# Patient Record
Sex: MALE | Race: BLACK OR AFRICAN AMERICAN | NOT HISPANIC OR LATINO | Employment: FULL TIME | URBAN - METROPOLITAN AREA
[De-identification: names, ages, dates, MRNs, and addresses within clinical notes are randomized per-mention and may not be internally consistent; named-entity substitution may affect disease eponyms.]

---

## 2023-08-09 ENCOUNTER — APPOINTMENT (OUTPATIENT)
Dept: PRIMARY CARE | Facility: CLINIC | Age: 28
End: 2023-08-09
Payer: COMMERCIAL

## 2024-02-10 ENCOUNTER — HOSPITAL ENCOUNTER (EMERGENCY)
Facility: HOSPITAL | Age: 29
Discharge: HOME | End: 2024-02-11
Attending: EMERGENCY MEDICINE
Payer: COMMERCIAL

## 2024-02-10 ENCOUNTER — APPOINTMENT (OUTPATIENT)
Dept: CARDIOLOGY | Facility: HOSPITAL | Age: 29
End: 2024-02-10
Payer: COMMERCIAL

## 2024-02-10 ENCOUNTER — APPOINTMENT (OUTPATIENT)
Dept: RADIOLOGY | Facility: HOSPITAL | Age: 29
End: 2024-02-10
Payer: COMMERCIAL

## 2024-02-10 DIAGNOSIS — R11.2 NAUSEA AND VOMITING, UNSPECIFIED VOMITING TYPE: Primary | ICD-10-CM

## 2024-02-10 LAB
ALBUMIN SERPL BCP-MCNC: 4.9 G/DL (ref 3.4–5)
ALP SERPL-CCNC: 62 U/L (ref 33–120)
ALT SERPL W P-5'-P-CCNC: 31 U/L (ref 10–52)
ANION GAP SERPL CALC-SCNC: 13 MMOL/L (ref 10–20)
AST SERPL W P-5'-P-CCNC: 26 U/L (ref 9–39)
BASOPHILS # BLD AUTO: 0.04 X10*3/UL (ref 0–0.1)
BASOPHILS NFR BLD AUTO: 0.4 %
BILIRUB SERPL-MCNC: 1.3 MG/DL (ref 0–1.2)
BUN SERPL-MCNC: 21 MG/DL (ref 6–23)
CALCIUM SERPL-MCNC: 9.8 MG/DL (ref 8.6–10.3)
CHLORIDE SERPL-SCNC: 97 MMOL/L (ref 98–107)
CO2 SERPL-SCNC: 29 MMOL/L (ref 21–32)
CREAT SERPL-MCNC: 1.51 MG/DL (ref 0.5–1.3)
EGFRCR SERPLBLD CKD-EPI 2021: 65 ML/MIN/1.73M*2
EOSINOPHIL # BLD AUTO: 0.05 X10*3/UL (ref 0–0.7)
EOSINOPHIL NFR BLD AUTO: 0.5 %
ERYTHROCYTE [DISTWIDTH] IN BLOOD BY AUTOMATED COUNT: 12 % (ref 11.5–14.5)
FLUAV RNA RESP QL NAA+PROBE: NOT DETECTED
FLUBV RNA RESP QL NAA+PROBE: NOT DETECTED
GLUCOSE SERPL-MCNC: 92 MG/DL (ref 74–99)
HCT VFR BLD AUTO: 56 % (ref 41–52)
HGB BLD-MCNC: 19.7 G/DL (ref 13.5–17.5)
IMM GRANULOCYTES # BLD AUTO: 0.06 X10*3/UL (ref 0–0.7)
IMM GRANULOCYTES NFR BLD AUTO: 0.6 % (ref 0–0.9)
LIPASE SERPL-CCNC: 19 U/L (ref 9–82)
LYMPHOCYTES # BLD AUTO: 0.47 X10*3/UL (ref 1.2–4.8)
LYMPHOCYTES NFR BLD AUTO: 4.4 %
MAGNESIUM SERPL-MCNC: 1.94 MG/DL (ref 1.6–2.4)
MCH RBC QN AUTO: 32 PG (ref 26–34)
MCHC RBC AUTO-ENTMCNC: 35.2 G/DL (ref 32–36)
MCV RBC AUTO: 91 FL (ref 80–100)
MONOCYTES # BLD AUTO: 0.7 X10*3/UL (ref 0.1–1)
MONOCYTES NFR BLD AUTO: 6.5 %
NEUTROPHILS # BLD AUTO: 9.42 X10*3/UL (ref 1.2–7.7)
NEUTROPHILS NFR BLD AUTO: 87.6 %
NRBC BLD-RTO: 0 /100 WBCS (ref 0–0)
PLATELET # BLD AUTO: 199 X10*3/UL (ref 150–450)
POTASSIUM SERPL-SCNC: 4.2 MMOL/L (ref 3.5–5.3)
PROT SERPL-MCNC: 8.1 G/DL (ref 6.4–8.2)
RBC # BLD AUTO: 6.16 X10*6/UL (ref 4.5–5.9)
SARS-COV-2 RNA RESP QL NAA+PROBE: NOT DETECTED
SODIUM SERPL-SCNC: 135 MMOL/L (ref 136–145)
WBC # BLD AUTO: 10.7 X10*3/UL (ref 4.4–11.3)

## 2024-02-10 PROCEDURE — 71046 X-RAY EXAM CHEST 2 VIEWS: CPT | Performed by: SURGERY

## 2024-02-10 PROCEDURE — 83735 ASSAY OF MAGNESIUM: CPT | Performed by: EMERGENCY MEDICINE

## 2024-02-10 PROCEDURE — 96375 TX/PRO/DX INJ NEW DRUG ADDON: CPT

## 2024-02-10 PROCEDURE — 96361 HYDRATE IV INFUSION ADD-ON: CPT

## 2024-02-10 PROCEDURE — 36415 COLL VENOUS BLD VENIPUNCTURE: CPT | Performed by: EMERGENCY MEDICINE

## 2024-02-10 PROCEDURE — 96374 THER/PROPH/DIAG INJ IV PUSH: CPT

## 2024-02-10 PROCEDURE — 80053 COMPREHEN METABOLIC PANEL: CPT | Performed by: EMERGENCY MEDICINE

## 2024-02-10 PROCEDURE — 99284 EMERGENCY DEPT VISIT MOD MDM: CPT | Mod: 25 | Performed by: EMERGENCY MEDICINE

## 2024-02-10 PROCEDURE — 2500000004 HC RX 250 GENERAL PHARMACY W/ HCPCS (ALT 636 FOR OP/ED): Performed by: EMERGENCY MEDICINE

## 2024-02-10 PROCEDURE — 87636 SARSCOV2 & INF A&B AMP PRB: CPT | Performed by: EMERGENCY MEDICINE

## 2024-02-10 PROCEDURE — 85025 COMPLETE CBC W/AUTO DIFF WBC: CPT | Performed by: EMERGENCY MEDICINE

## 2024-02-10 PROCEDURE — 93005 ELECTROCARDIOGRAM TRACING: CPT

## 2024-02-10 PROCEDURE — 83690 ASSAY OF LIPASE: CPT | Performed by: EMERGENCY MEDICINE

## 2024-02-10 PROCEDURE — 71046 X-RAY EXAM CHEST 2 VIEWS: CPT

## 2024-02-10 RX ORDER — ONDANSETRON HYDROCHLORIDE 2 MG/ML
4 INJECTION, SOLUTION INTRAVENOUS ONCE
Status: COMPLETED | OUTPATIENT
Start: 2024-02-10 | End: 2024-02-10

## 2024-02-10 RX ORDER — KETOROLAC TROMETHAMINE 30 MG/ML
15 INJECTION, SOLUTION INTRAMUSCULAR; INTRAVENOUS ONCE
Status: DISCONTINUED | OUTPATIENT
Start: 2024-02-10 | End: 2024-02-11 | Stop reason: HOSPADM

## 2024-02-10 RX ORDER — ONDANSETRON 4 MG/1
8 TABLET, ORALLY DISINTEGRATING ORAL EVERY 8 HOURS PRN
Qty: 30 TABLET | Refills: 0 | Status: SHIPPED | OUTPATIENT
Start: 2024-02-10

## 2024-02-10 RX ORDER — METOCLOPRAMIDE 10 MG/1
10 TABLET ORAL EVERY 6 HOURS PRN
Qty: 28 TABLET | Refills: 0 | Status: SHIPPED | OUTPATIENT
Start: 2024-02-10 | End: 2024-02-17

## 2024-02-10 RX ORDER — METOCLOPRAMIDE HYDROCHLORIDE 5 MG/ML
10 INJECTION INTRAMUSCULAR; INTRAVENOUS ONCE
Status: COMPLETED | OUTPATIENT
Start: 2024-02-10 | End: 2024-02-10

## 2024-02-10 RX ADMIN — SODIUM CHLORIDE, POTASSIUM CHLORIDE, SODIUM LACTATE AND CALCIUM CHLORIDE 1000 ML: 600; 310; 30; 20 INJECTION, SOLUTION INTRAVENOUS at 23:14

## 2024-02-10 RX ADMIN — METOCLOPRAMIDE 10 MG: 5 INJECTION, SOLUTION INTRAMUSCULAR; INTRAVENOUS at 22:51

## 2024-02-10 RX ADMIN — ONDANSETRON 4 MG: 2 INJECTION INTRAMUSCULAR; INTRAVENOUS at 22:51

## 2024-02-10 ASSESSMENT — LIFESTYLE VARIABLES
HAVE PEOPLE ANNOYED YOU BY CRITICIZING YOUR DRINKING: NO
EVER HAD A DRINK FIRST THING IN THE MORNING TO STEADY YOUR NERVES TO GET RID OF A HANGOVER: NO
HAVE YOU EVER FELT YOU SHOULD CUT DOWN ON YOUR DRINKING: NO
EVER FELT BAD OR GUILTY ABOUT YOUR DRINKING: NO

## 2024-02-10 ASSESSMENT — COLUMBIA-SUICIDE SEVERITY RATING SCALE - C-SSRS
2. HAVE YOU ACTUALLY HAD ANY THOUGHTS OF KILLING YOURSELF?: NO
6. HAVE YOU EVER DONE ANYTHING, STARTED TO DO ANYTHING, OR PREPARED TO DO ANYTHING TO END YOUR LIFE?: NO
1. IN THE PAST MONTH, HAVE YOU WISHED YOU WERE DEAD OR WISHED YOU COULD GO TO SLEEP AND NOT WAKE UP?: NO

## 2024-02-10 ASSESSMENT — PAIN DESCRIPTION - LOCATION: LOCATION: HEAD

## 2024-02-10 ASSESSMENT — PAIN DESCRIPTION - PAIN TYPE: TYPE: ACUTE PAIN

## 2024-02-10 ASSESSMENT — PAIN SCALES - GENERAL: PAINLEVEL_OUTOF10: 2

## 2024-02-10 ASSESSMENT — PAIN - FUNCTIONAL ASSESSMENT: PAIN_FUNCTIONAL_ASSESSMENT: 0-10

## 2024-02-11 VITALS
HEART RATE: 89 BPM | DIASTOLIC BLOOD PRESSURE: 76 MMHG | SYSTOLIC BLOOD PRESSURE: 118 MMHG | TEMPERATURE: 99.5 F | RESPIRATION RATE: 18 BRPM | HEIGHT: 70 IN | WEIGHT: 180 LBS | BODY MASS INDEX: 25.77 KG/M2 | OXYGEN SATURATION: 97 %

## 2024-02-11 NOTE — ED PROVIDER NOTES
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        This is a 28-year-old man who presents with complaint of nausea and vomiting.  Patient states he started having subjective chills, and feeling like he might have an infection.  He did notice nausea, but was able to manage.  Tonight he states he developed intractable vomiting for the past 3 hours prior to presentation.  He is complaining of epigastric abdominal discomfort, worse after vomiting.  Pain was not present prior to vomiting for several hours.  Has not had any diarrhea.  Is still passing flatus.  He has a mild right-sided posterior headache, which she states is normal for him.  He states this came on gradually.  He does not have any neck pain or stiffness.  He does not have any visual changes.  He denies any weakness, numbness, tingling in his extremities.  He does state that after vomiting for several hours he began to feel lightheaded with standing.  Has been trying to take p.o., but is unable to keep it down.  No additional complaints.      History provided by:  Patient and significant other                      [place] Coma Scale Score: 15                     Patient History   No past medical history on file.  No past surgical history on file.  No family history on file.  Social History     Tobacco Use    Smoking status: Not on file    Smokeless tobacco: Not on file   Substance Use Topics    Alcohol use: Not on file    Drug use: Not on file       Physical Exam   ED Triage Vitals [02/10/24 2124]   Temperature Heart Rate Respirations BP   37.5 °C (99.5 °F) (!) 110 16 149/89      Pulse Ox Temp Source Heart Rate Source Patient Position   98 % Oral Monitor --      BP Location FiO2 (%)     -- --       Physical Exam  Constitutional:       General: He is not in acute distress.  HENT:      Head: Normocephalic and  atraumatic.      Right Ear: External ear normal.      Left Ear: External ear normal.      Nose: Nose normal.      Mouth/Throat:      Mouth: Mucous membranes are moist.      Pharynx: Oropharynx is clear.   Eyes:      Extraocular Movements: Extraocular movements intact.      Pupils: Pupils are equal, round, and reactive to light.   Cardiovascular:      Rate and Rhythm: Normal rate and regular rhythm.   Pulmonary:      Effort: Pulmonary effort is normal. No respiratory distress.   Abdominal:      General: There is no distension.      Palpations: Abdomen is soft.      Comments: Mild midline upper abdominal tenderness to palpation. No rebound or guarding. No other areas of tenderness.   Musculoskeletal:         General: No swelling or deformity.      Cervical back: Normal range of motion and neck supple. No rigidity.   Skin:     General: Skin is warm and dry.   Neurological:      General: No focal deficit present.      Mental Status: He is alert and oriented to person, place, and time.      Comments: No focal deficits appreciated   Psychiatric:         Mood and Affect: Mood normal.         Behavior: Behavior normal.       Labs Reviewed   CBC WITH AUTO DIFFERENTIAL - Abnormal       Result Value    WBC 10.7      nRBC 0.0      RBC 6.16 (*)     Hemoglobin 19.7 (*)     Hematocrit 56.0 (*)     MCV 91      MCH 32.0      MCHC 35.2      RDW 12.0      Platelets 199      Neutrophils % 87.6      Immature Granulocytes %, Automated 0.6      Lymphocytes % 4.4      Monocytes % 6.5      Eosinophils % 0.5      Basophils % 0.4      Neutrophils Absolute 9.42 (*)     Immature Granulocytes Absolute, Automated 0.06      Lymphocytes Absolute 0.47 (*)     Monocytes Absolute 0.70      Eosinophils Absolute 0.05      Basophils Absolute 0.04     COMPREHENSIVE METABOLIC PANEL - Abnormal    Glucose 92      Sodium 135 (*)     Potassium 4.2      Chloride 97 (*)     Bicarbonate 29      Anion Gap 13      Urea Nitrogen 21      Creatinine 1.51 (*)     eGFR  65      Calcium 9.8      Albumin 4.9      Alkaline Phosphatase 62      Total Protein 8.1      AST 26      Bilirubin, Total 1.3 (*)     ALT 31     LIPASE - Normal    Lipase 19      Narrative:     Venipuncture immediately after or during the administration of Metamizole may lead to falsely low results. Testing should be performed immediately prior to Metamizole dosing.   MAGNESIUM - Normal    Magnesium 1.94     INFLUENZA A AND B PCR - Normal    Flu A Result Not Detected      Flu B Result Not Detected      Narrative:     This assay is an in vitro diagnostic multiplex nucleic acid amplification test for the detection and discrimination of Influenza A & B from nasopharyngeal specimens, and has been validated for use at [facility]. Negative results do not preclude Influenza A/B infections, and should not be used as the sole basis for diagnosis, treatment, or other management decisions. If Influenza A/B and RSV PCR results are negative, testing for Parainfluenza virus, Adenovirus and Metapneumovirus is routinely performed for [facility] pediatric oncology and intensive care inpatients, and is available on other patients by placing an add-on request.   SARS-COV-2 PCR - Normal    Coronavirus 2019, PCR Not Detected      Narrative:     This assay has received FDA Emergency Use Authorization (EUA) and is only authorized for the duration of time that circumstances exist to justify the authorization of the emergency use of in vitro diagnostic tests for the detection of SARS-CoV-2 virus and/or diagnosis of COVID-19 infection under section 564(b)(1) of the Act, 21 U.S.C. 360bbb-3(b)(1). This assay is an in vitro diagnostic nucleic acid amplification test for the qualitative detection of SARS-CoV-2 from nasopharyngeal specimens and has been validated for use at [facility]. Negative results do not preclude COVID-19 infections and should not be used as the sole basis for diagnosis, treatment,  or other management decisions.       XR chest 2 views   Final Result   1.  No evidence of acute cardiopulmonary process.                  MACRO:   None        Signed by: Cristofer Vera 2/10/2024 11:29 PM   Dictation workstation:   [id number]          ED Course & MDM   Diagnoses as of 02/10/24 7763   Nausea and vomiting, unspecified vomiting type       Medical Decision Making  Patient presented as above. He is awake, alert, uncomfortable appearing. Does have dry mucuous membranes.  Overall constellation of symptoms is consistent with a viral syndrome.  He has no focal neurologic deficits.  Has no meningeal signs.  Do not feel emergent head imaging is warranted based on history of headache and exam.  Abdomen is benign and do not feel that emergent CT is warranted at this time.  Patient was started on symptomatic treatment with IV fluids, antiemetics, analgesics.  Laboratory studies were obtained.  Labs show evidence of dehydration with hemoconcentration and mild creatinine elevation.  Patient reported almost complete resolution of symptoms on reassessment.  Labs are otherwise overall reassuring.  Chest x-ray shows no evidence of consolidation or infiltrate.  In discussion with patient, he feels significantly improved.  His abdomen actually feels better.  He feels comfortable being discharged home to manage as an outpatient.  He is provided with prescriptions for ondansetron and Compazine.  Return precautions provided.  Discharged in stable condition.    Amount and/or Complexity of Data Reviewed  Labs: ordered. Decision-making details documented in ED Course.  Radiology: ordered and independent interpretation performed.     Details: Per my interpretation, chest x-ray shows no radiographic abnormality within the chest  ECG/medicine tests: ordered and independent interpretation performed.     Details: Per my interpretation, EKG shows sinus tachycardia at a rate of 106 bpm.  Axis is 70 degrees.  W[place] interval is 150 ms, QRS is  82 ms, QTc is 406 ms.  No ST segment elevation or depression.    Risk  Prescription drug management.        Procedure  Procedures     Natanael Alaniz MD  02/10/24 2841

## 2024-02-12 LAB
ATRIAL RATE: 106 BPM
P AXIS: 78 DEGREES
P OFFSET: 187 MS
P ONSET: 138 MS
PR INTERVAL: 150 MS
Q ONSET: 213 MS
QRS COUNT: 18 BEATS
QRS DURATION: 82 MS
QT INTERVAL: 306 MS
QTC CALCULATION(BAZETT): 406 MS
QTC FREDERICIA: 369 MS
R AXIS: 70 DEGREES
T AXIS: 64 DEGREES
T OFFSET: 366 MS
VENTRICULAR RATE: 106 BPM

## 2024-03-28 ENCOUNTER — APPOINTMENT (OUTPATIENT)
Dept: PRIMARY CARE | Facility: CLINIC | Age: 29
End: 2024-03-28
Payer: COMMERCIAL

## 2024-04-01 ENCOUNTER — APPOINTMENT (OUTPATIENT)
Dept: PRIMARY CARE | Facility: CLINIC | Age: 29
End: 2024-04-01
Payer: COMMERCIAL

## 2024-05-30 ENCOUNTER — APPOINTMENT (OUTPATIENT)
Dept: PRIMARY CARE | Facility: CLINIC | Age: 29
End: 2024-05-30
Payer: COMMERCIAL

## 2024-07-02 ENCOUNTER — APPOINTMENT (OUTPATIENT)
Dept: PRIMARY CARE | Facility: CLINIC | Age: 29
End: 2024-07-02
Payer: COMMERCIAL

## 2024-09-16 ENCOUNTER — CONSULT (OUTPATIENT)
Dept: ENDOCRINOLOGY | Facility: CLINIC | Age: 29
End: 2024-09-16
Payer: COMMERCIAL

## 2024-09-16 VITALS
HEART RATE: 65 BPM | DIASTOLIC BLOOD PRESSURE: 73 MMHG | WEIGHT: 181 LBS | HEIGHT: 70 IN | BODY MASS INDEX: 25.91 KG/M2 | SYSTOLIC BLOOD PRESSURE: 115 MMHG

## 2024-09-16 DIAGNOSIS — Z31.41 FERTILITY TESTING: Primary | ICD-10-CM

## 2024-09-16 PROCEDURE — 99202 OFFICE O/P NEW SF 15 MIN: CPT | Performed by: STUDENT IN AN ORGANIZED HEALTH CARE EDUCATION/TRAINING PROGRAM

## 2024-09-16 PROCEDURE — 99212 OFFICE O/P EST SF 10 MIN: CPT | Performed by: STUDENT IN AN ORGANIZED HEALTH CARE EDUCATION/TRAINING PROGRAM

## 2024-09-16 ASSESSMENT — PAIN SCALES - GENERAL: PAINLEVEL: 0-NO PAIN

## 2024-09-16 NOTE — PROGRESS NOTES
"  NEW FERTILITY PATIENT VISIT- Male Partner    Partner information:     Yaniv Colorado is a 29 y.o. 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     MEDICATIONS  Current Outpatient Medications on File Prior to Visit   Medication Sig Dispense Refill    metoclopramide (Reglan) 10 mg tablet Take 1 tablet (10 mg) by mouth every 6 hours if needed (headache or nausea) for up to 7 days. 28 tablet 0    ondansetron ODT (Zofran-ODT) 4 mg disintegrating tablet Take 2 tablets (8 mg) by mouth every 8 hours if needed for nausea or vomiting. 30 tablet 0     No current facility-administered medications on file prior to visit.       PSH  No past surgical history on file.       SOCIAL HISTORY  Social History     Tobacco Use    Smoking status: Never    Smokeless tobacco: Never   Substance Use Topics    Alcohol use: Yes    Drug use: Never         FAMILY HISTORY   No family history on file.     BMI:   BMI Readings from Last 1 Encounters:   09/16/24 25.97 kg/m²     VITALS:  /73 (BP Location: Right arm, Patient Position: Sitting, BP Cuff Size: Adult)   Pulse 65   Ht 1.778 m (5' 10\")   Wt 82.1 kg (181 lb)   BMI 25.97 kg/m²       ASSESSMENT   29 y.o. male presents with partner for infertility evaluation  SA:  Ordered      PLAN  Orders Placed This Encounter   Procedures    POCT Semen Analysis Complete with Strict Morphology       PARTNER  Yes Semen Analysis: Ordered  NA  STDs as above    FOLLOW UP   Follow up with partner for follow up visit as directed to review result and further management. "     Jaye Rousseau  09/16/2024  9:02 AM

## 2024-11-05 ENCOUNTER — TELEPHONE (OUTPATIENT)
Dept: SCHEDULING | Age: 29
End: 2024-11-05
Payer: COMMERCIAL

## 2024-12-19 ENCOUNTER — TELEPHONE (OUTPATIENT)
Dept: ENDOCRINOLOGY | Facility: CLINIC | Age: 29
End: 2024-12-19

## 2024-12-19 DIAGNOSIS — Z31.41 FERTILITY TESTING: ICD-10-CM

## 2024-12-19 NOTE — TELEPHONE ENCOUNTER
Reason for call: Patient is calling to get scheduled for a SA order  please put an order.  Notes:

## 2025-02-04 ENCOUNTER — ANCILLARY PROCEDURE (OUTPATIENT)
Dept: ENDOCRINOLOGY | Facility: CLINIC | Age: 30
End: 2025-02-04
Payer: COMMERCIAL

## 2025-02-04 DIAGNOSIS — Z31.41 FERTILITY TESTING: ICD-10-CM

## 2025-02-04 LAB
% EX RESIDUAL CYTOPLASM (SEMEN): 0 %
% HEAD DEFECTS (SEMEN): 96.5 %
% NECK MIDPIECE (SEMEN): 18.5 %
% NORMAL (SEMEN): 3.5 % (ref 4–?)
% TAIL DEFECTS (SEMEN): 5 %
ABSTINENCE (DAYS): 3 DAYS (ref 2–7)
AGGLUTINATION (SEMEN): NO
ANALYZED TIME:: ABNORMAL
ANDROLOGY LAB ID#: ABNORMAL
CLUMPS (SEMEN): NO
COLLECTED COMPLETELY: YES
COLLECTION LOCATION:: ABNORMAL
COLLECTION METHOD:: ABNORMAL
CONCENTRATION(SEMEN): 25.42 MILL/ML (ref 15–?)
DEBRIS (SEMEN): YES
NON PROG. MOTILITY (SEMEN): 8 %
PROG. MOTILITY (SEMEN): 65 % (ref 32–?)
RECEIVED TIME:: ABNORMAL
REI PARTNER DOB: ABNORMAL
REI PARTNER NAME: ABNORMAL
SEMEN APPEARANCE: NORMAL
SEMEN LIQUEFACTION: NORMAL
SEMEN VISCOSITY: NORMAL
TOT. NO OF NORM. MOTILE SPERM (SEMEN): 2.33 MILL
TOT. NO OF NORM. SPERM (SEMEN): 3.2 MILL
TOTAL MOTILITY (SEMEN): 73 % (ref 40–?)
TOTAL NO OF MOTILE (SEMEN): 66.66 MILL
TOTAL NO OF SPERM (SEMEN): 91.51 MILL (ref 39–?)
VOLUME (SEMEN): 3.6 ML (ref 1.5–?)

## 2025-02-04 PROCEDURE — 89322 SEMEN ANAL STRICT CRITERIA: CPT | Performed by: OBSTETRICS & GYNECOLOGY

## 2025-05-08 DIAGNOSIS — Z11.3 ENCOUNTER FOR SCREENING EXAMINATION FOR SEXUALLY TRANSMITTED DISEASE: ICD-10-CM

## 2025-05-28 ENCOUNTER — APPOINTMENT (OUTPATIENT)
Dept: PRIMARY CARE | Facility: CLINIC | Age: 30
End: 2025-05-28
Payer: COMMERCIAL

## 2025-07-12 ENCOUNTER — OFFICE VISIT (OUTPATIENT)
Dept: URGENT CARE | Age: 30
End: 2025-07-12
Payer: COMMERCIAL

## 2025-07-12 VITALS
WEIGHT: 180 LBS | RESPIRATION RATE: 16 BRPM | DIASTOLIC BLOOD PRESSURE: 81 MMHG | HEIGHT: 70 IN | HEART RATE: 69 BPM | BODY MASS INDEX: 25.77 KG/M2 | SYSTOLIC BLOOD PRESSURE: 120 MMHG | OXYGEN SATURATION: 97 % | TEMPERATURE: 98.2 F

## 2025-07-12 DIAGNOSIS — H01.005 BLEPHARITIS OF LEFT LOWER EYELID, UNSPECIFIED TYPE: Primary | ICD-10-CM

## 2025-07-12 RX ORDER — AMOXICILLIN AND CLAVULANATE POTASSIUM 875; 125 MG/1; MG/1
875 TABLET, FILM COATED ORAL 2 TIMES DAILY
Qty: 14 TABLET | Refills: 0 | Status: SHIPPED | OUTPATIENT
Start: 2025-07-12 | End: 2025-07-19

## 2025-07-12 RX ORDER — LORATADINE 10 MG/1
TABLET ORAL
COMMUNITY

## 2025-07-12 ASSESSMENT — PATIENT HEALTH QUESTIONNAIRE - PHQ9
SUM OF ALL RESPONSES TO PHQ9 QUESTIONS 1 AND 2: 0
1. LITTLE INTEREST OR PLEASURE IN DOING THINGS: NOT AT ALL
2. FEELING DOWN, DEPRESSED OR HOPELESS: NOT AT ALL

## 2025-07-12 NOTE — PROGRESS NOTES
"Subjective   Patient ID: Yaniv Colorado is a 30 y.o. male. They present today with a chief complaint of Eye Problem (Stye 2-3 days).    History of Present Illness    Eye Problem    30-year-old patient presents to clinic with complaints of erythema with associated edema, warmth, pain of the left lower eyelid ongoing for the past 2 to 3 days.  Patient reports has tried warm compresses, teabags, stye ointment over-the-counter without relief. Denies fevers, chills, body aches, eye discharge, vision changes, foreign body sensation, dizziness, headaches, cough, sore throat, ear pain.      Past Medical History  Allergies as of 07/12/2025    (No Known Allergies)       Prescriptions Prior to Admission[1]     Medical History[2]    Surgical History[3]     reports that he has never smoked. He has never used smokeless tobacco. He reports current alcohol use. He reports that he does not use drugs.    Review of Systems  Review of Systems     ROS negative with the exception as noted on HPI                            Objective    Vitals:    07/12/25 1912   BP: 120/81   Pulse: 69   Resp: 16   Temp: 36.8 °C (98.2 °F)   SpO2: 97%   Weight: 81.6 kg (180 lb)   Height: 1.778 m (5' 10\")     No LMP for male patient.    Physical Exam  Constitutional:       Appearance: Normal appearance.   HENT:      Head: Normocephalic and atraumatic.      Right Ear: Tympanic membrane, ear canal and external ear normal.      Left Ear: Tympanic membrane, ear canal and external ear normal.      Nose: No mucosal edema or rhinorrhea.      Right Sinus: No maxillary sinus tenderness or frontal sinus tenderness.      Left Sinus: No maxillary sinus tenderness or frontal sinus tenderness.      Mouth/Throat:      Lips: Pink.      Mouth: Mucous membranes are moist. No oral lesions.      Dentition: Normal dentition. No gingival swelling.      Tongue: No lesions. Tongue does not deviate from midline.      Palate: No mass and lesions.      Pharynx: No pharyngeal swelling, " posterior oropharyngeal erythema, uvula swelling or postnasal drip.   Eyes:      General:         Right eye: No discharge or hordeolum.         Left eye: Hordeolum (left lower eyelid; internal) present.No discharge.      Extraocular Movements:      Right eye: Normal extraocular motion.      Left eye: Normal extraocular motion.      Conjunctiva/sclera:      Right eye: Right conjunctiva is not injected. No chemosis.     Left eye: Left conjunctiva is not injected. No chemosis.     Comments:  left lower eyelid erythema, edema, tenderness extending into left periorbital area.   Cardiovascular:      Rate and Rhythm: Normal rate and regular rhythm.      Heart sounds: No murmur heard.  Pulmonary:      Effort: Pulmonary effort is normal. No respiratory distress.      Breath sounds: Normal breath sounds. No stridor. No wheezing, rhonchi or rales.   Lymphadenopathy:      Cervical: No cervical adenopathy.   Neurological:      Mental Status: He is alert.         Procedures    Point of Care Test & Imaging Results from this visit  No results found for this visit on 07/12/25.   Imaging  No results found.    Cardiology, Vascular, and Other Imaging  No other imaging results found for the past 2 days      Diagnostic study results (if any) were reviewed by Belinda Dubon PA-C.    Assessment/Plan   Allergies, medications, history, and pertinent labs/EKGs/Imaging reviewed by Belinda Dubon PA-C.   erythema with associated edema, warmth, pain of the left lower eyelid ongoing for the past 2 to 3 days.   Augmentin started.   Take antibiotics as directed to completion. Pt. Is advised to advised to use warm wash cloth to clean of lash crusting/matting. Avoid spreading infection by washing hands anytime that area is touched. May use warm compresses as needed for comfort. Monitor for worsening erythema, edema, discharge, vision changes, pain with eye movement, severe headaches and if any of these occur RTC or proceed to the ED.      Medical Decision Making      Orders and Diagnoses  Diagnoses and all orders for this visit:  Blepharitis of left lower eyelid, unspecified type  -     amoxicillin-clavulanate (Augmentin) 875-125 mg tablet; Take 1 tablet by mouth 2 times a day for 7 days.      Medical Admin Record      Patient disposition: Home    Electronically signed by Belinda Dubon PA-C  8:05 AM           [1] (Not in a hospital admission)   [2] No past medical history on file.  [3] No past surgical history on file.